# Patient Record
Sex: MALE | Race: WHITE | Employment: FULL TIME | URBAN - METROPOLITAN AREA
[De-identification: names, ages, dates, MRNs, and addresses within clinical notes are randomized per-mention and may not be internally consistent; named-entity substitution may affect disease eponyms.]

---

## 2019-12-29 ENCOUNTER — HOSPITAL ENCOUNTER (EMERGENCY)
Age: 59
Discharge: HOME | End: 2019-12-29
Attending: EMERGENCY MEDICINE
Payer: COMMERCIAL

## 2019-12-29 ENCOUNTER — APPOINTMENT (OUTPATIENT)
Dept: GENERAL RADIOLOGY | Age: 59
End: 2019-12-29
Payer: COMMERCIAL

## 2019-12-29 VITALS
OXYGEN SATURATION: 96 % | HEIGHT: 73 IN | TEMPERATURE: 99.9 F | SYSTOLIC BLOOD PRESSURE: 156 MMHG | BODY MASS INDEX: 31.14 KG/M2 | WEIGHT: 235 LBS | HEART RATE: 113 BPM | RESPIRATION RATE: 18 BRPM | DIASTOLIC BLOOD PRESSURE: 94 MMHG

## 2019-12-29 LAB
DIRECT EXAM: ABNORMAL
DIRECT EXAM: ABNORMAL
Lab: ABNORMAL
SPECIMEN DESCRIPTION: ABNORMAL

## 2019-12-29 PROCEDURE — 87804 INFLUENZA ASSAY W/OPTIC: CPT

## 2019-12-29 PROCEDURE — 71046 X-RAY EXAM CHEST 2 VIEWS: CPT

## 2019-12-29 PROCEDURE — 99283 EMERGENCY DEPT VISIT LOW MDM: CPT

## 2019-12-29 RX ORDER — OSELTAMIVIR PHOSPHATE 75 MG/1
75 CAPSULE ORAL 2 TIMES DAILY
Qty: 10 CAPSULE | Refills: 0 | Status: SHIPPED | OUTPATIENT
Start: 2019-12-29 | End: 2020-01-03

## 2019-12-29 RX ORDER — DEXTROMETHORPHAN HYDROBROMIDE AND PROMETHAZINE HYDROCHLORIDE 15; 6.25 MG/5ML; MG/5ML
5 SYRUP ORAL 4 TIMES DAILY PRN
Qty: 118 ML | Refills: 0 | Status: SHIPPED | OUTPATIENT
Start: 2019-12-29 | End: 2020-01-05

## 2019-12-29 ASSESSMENT — PAIN SCALES - GENERAL: PAINLEVEL_OUTOF10: 5

## 2019-12-29 ASSESSMENT — PAIN DESCRIPTION - FREQUENCY: FREQUENCY: CONTINUOUS

## 2019-12-29 ASSESSMENT — PAIN DESCRIPTION - LOCATION: LOCATION: GENERALIZED

## 2019-12-29 ASSESSMENT — PAIN DESCRIPTION - DESCRIPTORS: DESCRIPTORS: ACHING

## 2019-12-29 NOTE — LETTER
[facility] ED  [address] [id number]  Phone: [phone]             December 29, 2019    Patient: Shanae Rodriguez   YOB: 1960   Date of Visit: 12/29/2019       To Whom It May Concern:    Shanae Rodriguez was seen and treated in our emergency department on 12/29/2019. He may return to work on January 1,2020.       Sincerely,             Signature:__________________________________

## 2019-12-30 NOTE — ED PROVIDER NOTES
EMERGENCY DEPARTMENT ENCOUNTER   ATTENDING ATTESTATION     Pt Name: Keyonna Swenson  MRN: [id number]  [place] 1960  Date of evaluation: 12/29/19   Keyonna Swenson is a 64 y.o. male with CC: Fever (onset 12/25) and Cough (green productive)    MDM:   Patient is a 65-year-old male who presented to emergency department secondary to cough and fever. Positive influenza A however patient was in no acute respiratory distress no evidence of pneumonia on chest x-ray. History of Tamiflu discharged home with outpatient follow-up and given parameters for return to the emergency department. CRITICAL CARE:       EKG: All EKG's are interpreted by the Emergency Department Physician who either signs or Co-signs this chart in the absence of a cardiologist.      RADIOLOGY:All plain film, CT, MRI, and formal ultrasound images (except ED bedside ultrasound) are read by the radiologist, see reports below, unless otherwise noted in MDM or here. XR CHEST STANDARD (2 VW)    (Results Pending)     LABS: All lab results were reviewed by myself, and all abnormals are listed below. Labs Reviewed   RAPID INFLUENZA A/B ANTIGENS - Abnormal; Notable for the following components:       Result Value    Direct Exam POSITIVE for Influenza A Antigen (*)     All other components within normal limits     CONSULTS:  None  FINAL IMPRESSION      1. Influenza A            PASTMEDICAL HISTORY     Past Medical History:   Diagnosis Date    Hyperlipidemia     Hypertension      SURGICAL HISTORY       Past Surgical History:   Procedure Laterality Date    CHOLECYSTECTOMY       CURRENT MEDICATIONS       Previous Medications    ALLOPURINOL (ZYLOPRIM PO)    Take by mouth nightly    LISINOPRIL (PRINIVIL;ZESTRIL) 20 MG TABLET    Take 20 mg by mouth daily. ALLERGIES     has No Known Allergies. FAMILY HISTORY     has no family status information on file.       SOCIAL HISTORY       Social History     Tobacco Use    Smoking status: Never Smoker    Smokeless

## 2020-01-01 ASSESSMENT — ENCOUNTER SYMPTOMS
COLOR CHANGE: 0
SHORTNESS OF BREATH: 0
DIARRHEA: 0
NAUSEA: 0
CONSTIPATION: 0
ABDOMINAL PAIN: 0
COUGH: 0
SINUS PRESSURE: 0
SORE THROAT: 0
VOMITING: 0
RHINORRHEA: 0
WHEEZING: 0

## 2020-01-01 NOTE — ED PROVIDER NOTES
[facility] ED  eMERGENCY dEPARTMENT eNCOUnter      Pt Name: Beth Harrison  MRN: [id number]  [place] 1960  Date of evaluation: 12/29/2019  Provider: Imani Claire NP, ALLIE - [id number]       Chief Complaint   Patient presents with    Fever     onset 12/25    Cough     green productive         HISTORY OF PRESENT ILLNESS  (Location/Symptom, Timing/Onset, Context/Setting, Quality, Duration, Modifying Factors, Severity.)   Beth Harrison is a 64 y.o. male who presents to the emergency department by private vehicle for evaluation of fever and cough. Patient states for last 2 days he has not felt well. He has had a dry harsh nonproductive cough. He is also had some fever and chills. He has had generalized body aches and myalgias and just has not felt well. He denies any nausea, vomiting, or diarrhea. Nursing Notes were reviewed. ALLERGIES     Patient has no known allergies. CURRENT MEDICATIONS       Discharge Medication List as of 12/29/2019  9:01 PM      CONTINUE these medications which have NOT CHANGED    Details   Allopurinol (ZYLOPRIM PO) Take by mouth nightlyHistorical Med      lisinopril (PRINIVIL;ZESTRIL) 20 MG tablet Take 20 mg by mouth daily. Historical Med             PAST MEDICAL HISTORY         Diagnosis Date    Hyperlipidemia     Hypertension        SURGICAL HISTORY           Procedure Laterality Date    CHOLECYSTECTOMY           FAMILY HISTORY     History reviewed. No pertinent family history. No family status information on file. SOCIAL HISTORY      reports that he has never smoked. He has never used smokeless tobacco. He reports previous alcohol use. He reports that he does not use drugs. REVIEW OF SYSTEMS    (2-9 systems for level 4, 10 or more for level 5)     Review of Systems   Constitutional: Negative for chills, fever and unexpected weight change. HENT: Negative for congestion, rhinorrhea, sinus pressure and sore throat.     Respiratory: Negative for cough, shortness of breath and wheezing. Cardiovascular: Negative for chest pain and palpitations. Gastrointestinal: Negative for abdominal pain, constipation, diarrhea, nausea and vomiting. Genitourinary: Negative for dysuria and hematuria. Musculoskeletal: Negative for arthralgias and myalgias. Skin: Negative for color change and rash. Neurological: Negative for dizziness, weakness and headaches. Hematological: Negative for adenopathy. All other systems reviewed and are negative. Except as noted above the remainder of the review of systems was reviewed and negative. PHYSICAL EXAM    (up to 7 for level 4, 8 or more for level 5)     ED Triage Vitals [12/29/19 1931]   BP Temp Temp Source Pulse Resp SpO2 Height Weight   (!) 156/94 99.9 °F (37.7 °C) Oral 113 18 96 % 6' 1\" (1.854 m) 235 lb (106.6 kg)       Physical Exam  Vitals signs reviewed. Constitutional:       Appearance: He is well-developed. HENT:      Head: Normocephalic and atraumatic. Eyes:      Conjunctiva/sclera: Conjunctivae normal.      Pupils: Pupils are equal, round, and reactive to light. Neck:      Musculoskeletal: Normal range of motion and neck supple. Cardiovascular:      Rate and Rhythm: Normal rate and regular rhythm. Pulmonary:      Effort: Pulmonary effort is normal. No respiratory distress. Breath sounds: Normal breath sounds. No stridor. Abdominal:      General: Bowel sounds are normal.      Palpations: Abdomen is soft. Musculoskeletal: Normal range of motion. Lymphadenopathy:      Cervical: No cervical adenopathy. Skin:     General: Skin is warm and dry. Findings: No rash. Neurological:      Mental Status: He is alert and oriented to person, place, and time.              RADIOLOGY:   Non-plain film images such as CT, Ultrasound and MRI are read by the radiologist. Plain radiographic images are visualized and preliminarily interpreted by the emergency physician with the below findings:    Xr Chest Standard (2 Vw)    Result Date: 12/29/2019  EXAMINATION: TWO XRAY VIEWS OF THE CHEST 12/29/2019 8:40 pm COMPARISON: None. HISTORY: ORDERING SYSTEM PROVIDED HISTORY: cough TECHNOLOGIST PROVIDED HISTORY: cough Reason for Exam: cough Acuity: Unknown Type of Exam: Unknown Relevant Medical/Surgical History: cough with brown sputum x 1 week FINDINGS: No infiltrate or consolidation or effusion is identified. The heart size is normal.     Clear lungs. Interpretation per the Radiologist below, if available at the time of this note:    XR CHEST STANDARD (2 VW)   Final Result   Clear lungs. LABS:  Labs Reviewed   RAPID INFLUENZA A/B ANTIGENS - Abnormal; Notable for the following components:       Result Value    Direct Exam POSITIVE for Influenza A Antigen (*)     All other components within normal limits       All other labs were within normal range or not returned as of this dictation. EMERGENCY DEPARTMENT COURSE and DIFFERENTIAL DIAGNOSIS/MDM:   Vitals:    Vitals:    12/29/19 1931   BP: (!) 156/94   Pulse: 113   Resp: 18   Temp: 99.9 °F (37.7 °C)   TempSrc: Oral   SpO2: 96%   Weight: 235 lb (106.6 kg)   Height: 6' 1\" (1.854 m)       Medical Decision Making: Patient was positive for influenza A. His chest x-ray is negative. He is in the therapeutic window for Tamiflu. Follow-up with primary care physician. FINAL IMPRESSION      1. Influenza A          DISPOSITION/PLAN   DISPOSITION Decision To Discharge 12/29/2019 08:58:59 PM      PATIENT REFERRED TO:   Tristen Miranda MD  [address] [place] [address]   [address]  [phone]    Schedule an appointment as soon as possible for a visit       [address]  [phone]    If symptoms worsen      DISCHARGE MEDICATIONS:     Discharge Medication List as of 12/29/2019  9:01 PM      START taking these medications    Details   oseltamivir (TAMIFLU) 75 MG capsule Take 1 capsule by mouth 2 times daily for 5 days, Disp-10 capsule, R-0Print      promethazine-dextromethorphan (PROMETHAZINE-DM) 6.25-15 MG/5ML syrup Take 5 mLs by mouth 4 times daily as needed for Cough, Disp-118 mL, R-0Print                 (Please note that portions of this note were completed with a voice recognition program.  Efforts were made to edit the dictations but occasionally words are mis-transcribed.)    Yuan Land NP, APRN - CNP  Certified Nurse Practitioner          ALLIE Shields CNP  01/01/20 [id number]

## 2020-11-23 ENCOUNTER — HOSPITAL ENCOUNTER (OUTPATIENT)
Age: 60
Discharge: HOME | End: 2020-11-23
Payer: COMMERCIAL

## 2020-11-23 ENCOUNTER — HOSPITAL ENCOUNTER (OUTPATIENT)
Dept: GENERAL RADIOLOGY | Age: 60
Discharge: HOME | End: 2020-11-25
Payer: COMMERCIAL

## 2020-11-23 PROCEDURE — 73130 X-RAY EXAM OF HAND: CPT
